# Patient Record
Sex: MALE | Race: ASIAN
[De-identification: names, ages, dates, MRNs, and addresses within clinical notes are randomized per-mention and may not be internally consistent; named-entity substitution may affect disease eponyms.]

---

## 2019-09-23 ENCOUNTER — APPOINTMENT (OUTPATIENT)
Dept: ORTHOPEDIC SURGERY | Facility: CLINIC | Age: 29
End: 2019-09-23
Payer: COMMERCIAL

## 2019-09-23 VITALS — HEIGHT: 74 IN | BODY MASS INDEX: 25.03 KG/M2 | WEIGHT: 195 LBS

## 2019-09-23 DIAGNOSIS — M25.572 PAIN IN LEFT ANKLE AND JOINTS OF LEFT FOOT: ICD-10-CM

## 2019-09-23 PROBLEM — Z00.00 ENCOUNTER FOR PREVENTIVE HEALTH EXAMINATION: Status: ACTIVE | Noted: 2019-09-23

## 2019-09-23 PROCEDURE — 99204 OFFICE O/P NEW MOD 45 MIN: CPT

## 2019-09-23 PROCEDURE — 73610 X-RAY EXAM OF ANKLE: CPT | Mod: LT

## 2019-09-23 NOTE — HISTORY OF PRESENT ILLNESS
[FreeTextEntry1] : Location: left ankle \par Quality: dull,aching\par Duration: 09/21/2019\par Context: Twisted ankle while playing basketball\par Aggravating Factors: Walking, weightbearing , standing\par Conservative treatment: ICE, nsaids , limited weightbearing\par Associated Symptoms: Swelling, bruising , stiffness\par Prior Studies: n.a\par

## 2019-09-23 NOTE — PHYSICAL EXAM
[de-identified] : Left ankle shows moderate amount of swelling and pain medially and laterally. Reduced range of motion and unable to assess an anterior drawer neurovascular exam is normal there is ecchymosis [de-identified] : Left ankle x-ray shows no evidence of fracture dislocation

## 2019-09-23 NOTE — DISCUSSION/SUMMARY
[de-identified] : Ice, elevate and place him into a [employer] Walker boot. We'll get an MRI. I will call him with the results. 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

## 2019-09-23 NOTE — REVIEW OF SYSTEMS
[Arthralgia] : arthralgia [Joint Pain] : joint pain [Joint Stiffness] : joint stiffness [Joint Swelling] : joint swelling